# Patient Record
Sex: FEMALE | Race: WHITE | NOT HISPANIC OR LATINO | Employment: FULL TIME | URBAN - METROPOLITAN AREA
[De-identification: names, ages, dates, MRNs, and addresses within clinical notes are randomized per-mention and may not be internally consistent; named-entity substitution may affect disease eponyms.]

---

## 2017-01-17 ENCOUNTER — ALLSCRIPTS OFFICE VISIT (OUTPATIENT)
Dept: OTHER | Facility: OTHER | Age: 32
End: 2017-01-17

## 2017-01-17 ENCOUNTER — GENERIC CONVERSION - ENCOUNTER (OUTPATIENT)
Dept: OTHER | Facility: OTHER | Age: 32
End: 2017-01-17

## 2017-01-17 LAB — HCG, QUALITATIVE (HISTORICAL): NEGATIVE

## 2017-08-22 ENCOUNTER — ALLSCRIPTS OFFICE VISIT (OUTPATIENT)
Dept: OTHER | Facility: OTHER | Age: 32
End: 2017-08-22

## 2018-01-09 NOTE — PROCEDURES
Assessment    1  Encounter for supervision of other normal pregnancy in first trimester (V22 1) (Z34 81)    Plan  H/O polyhydramnios, History of vacuum extraction assisted delivery, Irritable bowel  syndrome, MVP (mitral valve prolapse), Pregnancy, obstetrical care    · *1 - [facility] Referral  Level II U/S @ 20 wks growth & anatomy  with consultation  Status: Active  Requested for: 65CTO6123   Ordered; For: H/O polyhydramnios, History of vacuum extraction assisted delivery, Irritable bowel syndrome, MVP (mitral valve prolapse), Pregnancy, obstetrical care; Ordered By: Mariela Sadler Performed:  Due: 77ZOI0688; Last Updated By: Mortimer Carolus; 3/11/2016 1:24:11 PM  Care Summary provided  : Yes  History of vacuum extraction assisted delivery, Irritable bowel syndrome, MVP (mitral  valve prolapse), Pregnancy, obstetrical care    · *1 - [facility] Physician Referral  Sequential risk assessment (1st/2nd)  trimester with consultation  Status: Active  Requested for: 18HVI3930   Ordered; For: History of vacuum extraction assisted delivery, Irritable bowel syndrome, MVP (mitral valve prolapse), Pregnancy, obstetrical care; Ordered By: Mariela Sadler Performed:  Due: 73AZA2893; Last Updated By: Mortimer Carolus; 3/11/2016 1:23:55 PM  Care Summary provided  : Yes  Pregnancy, obstetrical care    · (1) CHLAMYDIA/GC AMPLIFIED DNA, PCR; Source:Cervix; Status:Active - Retrospective  By Protocol Authorization; Requested for:[id number];    Perform:[facility]; Due:84Vpr4495; Last Updated By:Dion Chiang; 3/16/2016 11:13:01 AM;Ordered; For:Pregnancy, obstetrical care; Ordered By:Jim Mcneill; Active Problems    1  Encounter for supervision of other normal pregnancy in first trimester (V22 1) (Z34 81)   2  H/O polyhydramnios (V13 29) (Z87 59)   3  Has received influenza vaccination in current influenza season (V49 89) (Z78 9)   4   History of vacuum extraction assisted delivery (V45 89) (Z98 89)   5  Irritable bowel syndrome (564 1) (K58 9)   6  MVP (mitral valve prolapse) (424 0) (I34 1)   7  Need for prophylactic vaccination with combined diphtheria-tetanus-pertussis (DTaP)   vaccine (V06 1) (Z23)   8  Pregnancy, obstetrical care (V22 1) (Z34 90)    Current Meds    1  Prenatal One Daily 27-0 8 MG Oral Tablet; Therapy: 42SYQ7147 to Recorded    Allergies    1  No Known Drug Allergies    2  No Known Food Allergies   3  Seasonal    Results/Data  [address] Transvaginal Ultrasound OB [facility]:   Indication: EDC gestational age unknown weeks  Exam indication: LMP unknown  Findings:   Number of gestational sacs and fetuses: 1  Gestational sac/fetal measurements appropriate for gestation: CRL 2 81cm 9w4d 1015/16  Survey of visible fetal and placental anatomic structure   Qualitative assessment of amniotic fluid volume/gestational sac shape: nl appearing  Exam of maternal uterus and adnexa: nl appearing  Impression: viable IUP  consent determined by ultrasound  Future Appointments    Date/Time Provider Specialty Site   09/16/2016 01:20 PM WILLIE Myrick  Obstetrics/Gynecology [facility]   04/19/2016 01:00 PM WILLIE Ambriz  Obstetrics/Gynecology [facility]   08/02/2016 01:20 PM WILLIE Ambriz  Obstetrics/Gynecology [facility]   06/21/2016 01:00 PM Yang Barrios CNM Obstetrics/Gynecology [facility]   09/02/2016 01:00 PM Yang Barrios CNM Obstetrics/Gynecology [facility]   07/22/2016 01:20 PM Yanna Francisco DO Obstetrics/Gynecology [facility]   10/07/2016 01:00 PM Aga Elias [facility] Obstetrics/Gynecology [address]  [facility]   09/28/2016 01:45 PM Leo Stern MD Obstetrics/Gynecology [facility]   05/18/2016 01:40 PM WILLIE Steen   Obstetrics/Gynecology [facility]   08/17/2016 01:20 PM WILLIE Marques  Obstetrics/Gynecology [facility]   09/21/2016 01:20 PM WILLIE Marques   Obstetrics/Gynecology [facility]     Signatures   Electronically signed by : Marina Barone MD; Mar 16 2016 11:40AM EST                       (Author)

## 2018-01-12 VITALS
SYSTOLIC BLOOD PRESSURE: 118 MMHG | DIASTOLIC BLOOD PRESSURE: 74 MMHG | WEIGHT: 152 LBS | BODY MASS INDEX: 26.93 KG/M2 | HEIGHT: 63 IN

## 2018-01-13 NOTE — PROGRESS NOTES
Assessment    1  Encounter for gynecological examination without abnormal finding (V72 31) (Z01 419)    Plan  Contraceptive management    · Junel FE 1/20 1-20 MG-MCG Oral Tablet; talke 1 daily   Rx By: Zeinab Roach; Dispense: 84 Days ; #:84 Tablet; Refill: 3; For: Contraceptive management; CHAN = N; Verified Transmission to [facility]-/115; Last Updated By: System, Satellier; 8/22/2017 12:20:03 PM  Encounter for gynecological examination without abnormal finding    · Call [phone] if: You find a new or different kind of lump in your breast ;  Status:Complete;   Done: 86Uga7395 12:58PM   Ordered;  For:Encounter for gynecological examination without abnormal finding; Ordered By:Ruben Mcneill;   · Eat a normal well-balanced diet ; Status:Complete;   Done: 71QTT1177 12:58PM   Ordered;  For:Encounter for gynecological examination without abnormal finding; Ordered By:Ruben Mcneill;   · Vitamins can help you get daily requirements that your diet may not be giving you ;  Status:Complete;   Done: 83Bxp1827 12:58PM   Ordered;  For:Encounter for gynecological examination without abnormal finding; Ordered By:Ruben Mcneill;   · We encourage all of our patients to exercise regularly  30 minutes of exercise or physical  activity five or more days a week is recommended for children and adults ;  Status:Complete;   Done: 50Shk7311 12:58PM   Ordered;  For:Encounter for gynecological examination without abnormal finding; Ordered By:Ruben Mcneill;   · We recommend that you follow these steps to lower your risk of osteoporosis  ;  Status:Complete;   Done: 21DVQ6907 12:58PM   Ordered;  For:Encounter for gynecological examination without abnormal finding; Ordered By:Ruben Mcneill;    Discussion/Summary  healthy adult female next cervical cancer screening is due 2019 Patient discussion: discussed with the patient, 15 minute visit, greater than half of the time was spent on counseling  The patient has the current Goals: Healthy lifestyle  The patent has the current Barriers: None  Patient is able to Self-Care  PATIENT EDUCATION RECORD She was given the following educational materials:  Age appropriate care guide  Possible side effects of new medications were reviewed with the patient/guardian today  The treatment plan was reviewed with the patient/guardian  The patient/guardian understands and agrees with the treatment plan     Self Referrals: No      Chief Complaint  Patient here for yearly exam       History of Present Illness  GYN HM, Adult Female [facility]: The patient is being seen for a gynecology evaluation  The last health maintenance visit was 1 year(s) ago  Social History: Household members include spouse, 2 daughter(s) and 1and 3year old  She is   Work status: working full time  General Health: The patient's health since the last visit is described as good  Lifestyle:  She consumes a diverse and healthy diet  She does not have any weight concerns  She does not exercise regularly  She does not use tobacco  She consumes alcohol  She denies drug use  Reproductive health:  she reports no menstrual problems  Menstrual history: LMP: the last menstrual period was 8/1/17  she uses contraception  For contraception, she uses oral contraception pills  she is sexually active  pregnancy history: G 2P 2  Screening: Cervical cancer screening includes a pap smear performed 1/20/15, neg and human papilloma virus screening performed 1/20/15, neg  Review of Systems  no pelvic pain, no pelvic pressure, no vaginal pain, no vaginal discharge, no vaginal itching, no vaginal lump or mass, no vaginal odor, no nonmenstrual bleeding, periods are regular, no dysuria, no change in urinary frequency, no feelings of urinary urgency and no urinary loss of control  Constitutional: No fever, no chills, feels well, no tiredness, no recent weight gain or loss     ENT: no ear ache, no loss of hearing, no nosebleeds or nasal discharge, no sore throat or hoarseness  Cardiovascular: no complaints of slow or fast heart rate, no chest pain, no palpitations, no leg claudication or lower extremity edema  Respiratory: no complaints of shortness of breath, no wheezing, no dyspnea on exertion, no orthopnea or PND  Breasts: no complaints of breast pain, breast lump or nipple discharge  Gastrointestinal: no complaints of abdominal pain, no constipation, no nausea or diarrhea, no vomiting, no bloody stools  Genitourinary: no complaints of dysuria, no incontinence, no pelvic pain, no dysmenorrhea, no vaginal discharge or abnormal vaginal bleeding  Musculoskeletal: no complaints of arthralgia, no myalgia, no joint swelling or stiffness, no limb pain or swelling  Integumentary: no complaints of skin rash or lesion, no itching or dry skin, no skin wounds  Neurological: no complaints of headache, no confusion, no numbness or tingling, no dizziness or fainting  Active Problems    1  Contraceptive management (V25 9) (Z30 9)   2  Irritable bowel syndrome (564 1) (K58 9)   3   MVP (mitral valve prolapse) (424 0) (I34 1)    Past Medical History    · History of Encounter for routine gynecological examination (V72 31) (Z01 419)   · History of Encounter for routine postpartum follow-up (V24 2) (Z39 2)   · H/O polyhydramnios (V13 29) (Z87 59)   · History of Has received influenza vaccination in current influenza season (V49 89)  (Z78 9)   · History of Hereditary Disease Possibly Affecting Fetus, Affecting Care Of Mother -  Antepartum Condition Or Complication (009 43)   · History of abnormal menstrual cycle (V13 29) (Z87 42)   · History of herpes zoster (V12 09) (Z86 19)   · History of herpes zoster (V12 09) (Z86 19)   · History of mitral valve disorder (V12 59) (Z86 79)   · History of pregnancy (V13 29)   · History of vacuum extraction assisted delivery (V45 89) (I01 485)   · History of Irritable bowel syndrome (564 1) (K58 9)   · History of Pelvic floor dysfunction (618 83) (M62 89)   · Post-term Pregnancy - Antepartum Condition Or Prior Complicated Delivery (050 97)    Surgical History    · History of Appendectomy   · History of Oral Surgery Tooth Extraction    Family History  Mother    · Family history of Hypertension (V17 49)  Father    · Family history of Acute Myocardial Infarction (V17 3)   · Family history of Heart Disease (V17 49)  Family History    · Family history of Diabetes Mellitus (V18 0)   · Family history of Fibromyalgia   · Family history of Gastric Cancer (V16 0)   · Family history of Heart Disease (V17 49)   · Family history of Peptic Ulcer    Social History    · Always uses seat belt   · Being A Social Drinker   · Daily Tea Consumption (2  Cups/Day)   · Exercising Erratically   · Exercising Regularly   · Marital History - Currently    · Never A Smoker   · Stopped Drinking Alcohol    Current Meds   1  Junel FE 1/20 1-20 MG-MCG Oral Tablet; take 1 tablet by mouth once daily; Therapy: 12Iir8164 to (Evaluate:20Exu5924)  Requested for: 91Pms6789; Last   Rx:76Ssh3244 Ordered    Allergies    1  No Known Drug Allergies    2  No Known Food Allergies   3  Seasonal    Vitals   Recorded: 19UUL8908 63:01YZ   Systolic 509   Diastolic 62   Height 5 ft 3 in   Weight 151 lb    BMI Calculated 26 75   BSA Calculated 1 72   LMP 39Nxf4838     Physical Exam    Constitutional   General appearance: No acute distress, well appearing and well nourished  Genitourinary   External genitalia: Normal and no lesions appreciated  Vagina: Normal, no lesions or dryness appreciated  Urethra: Normal     Urethral meatus: Normal     Bladder: Normal, soft, non-tender and no prolapse or masses appreciated  Cervix: Normal, no palpable masses  Uterus: Normal, non-tender, not enlarged, and no palpable masses  Adnexa/parametria: Normal, non-tender and no fullness or masses appreciated      Chest Breasts: Normal and no dimpling or skin changes noted  Abdomen   Abdomen: Normal, non-tender, and no organomegaly noted         Future Appointments    Date/Time Provider Specialty Site   08/27/2018 11:00 AM Chetna Mckeon MD Obstetrics/Gynecology [facility]     Signatures   Electronically signed by : Ignacia Doyle MD; Aug 22 2017 12:59PM EST                       (Author)

## 2018-01-14 NOTE — PROGRESS NOTES
MAY 27 2016         RE: Alycia Hutton                                  To: [facility]  MR#: [id number]                                    [address]   : [address]   ENC: [id number]                             [place], [address]   (Exam #: [id number])                           Fax: [phone]      The LMP of this 33year old,  G2, P1-0-0-1 patient was unknown, her   working DASHA is OCT 13 2016 and the current gestational age is 24 weeks 6   days by 1st Trimester Sono  A sonographic examination was performed on MAY   27 2016 using real time equipment  The ultrasound examination was   performed using abdominal & vaginal techniques  The patient has a BMI of   27 3  Her blood pressure today was 110/55  Earliest ultrasound found in her record: 3/16/16 9w4d 10/15/16 DASHA      Thank you very much for referring this very nice patient for a    evaluation and fetal anatomic survey  This is the patient's second   pregnancy  She has a history of an uncomplicated vaginal delivery in     He states she has mitral valve prolapse but has not had any   significant issues with regard to this very common and generally benign   condition  She has a surgical history of an appendectomy  She denies   current use of tobacco, alcohol or drugs  Her medications include   vitamins and she has no significant drug allergies  Her family medical   history is noncontributory  A review of systems is otherwise negative  On   exam, the patient appears well, in no acute distress, and her abdomen is   nontender        Cardiac motion was observed at 154 bpm       INDICATIONS      fetal anatomical survey      Exam Types      LEVEL II   Transvaginal      RESULTS      Fetus # 1 of 1   Vertex presentation   Fetal growth appeared normal   Placenta Location = Anterior   No placenta previa   Placenta Grade = I      MEASUREMENTS (* Included In Average GA)      AC              14 6 cm        19 weeks 4 days* (46%)   BPD              4 7 cm        20 weeks 1 day * (56%)   HC              16 9 cm        19 weeks 4 days* (36%)   Femur            3 3 cm        20 weeks 4 days* (54%)      Nuchal Fold      4 5 mm      Humerus          3 2 cm        20 weeks 4 days  (65%)   Radius           2 5 cm        20 weeks 1 day   Ulna             2 7 cm        20 weeks 0 days   Tibia            2 8 cm        20 weeks 1 day   (53%)   Fibula           2 8 cm        19 weeks 3 days   Foot             3 4 cm        20 weeks 4 days      Cerebellum       2 0 cm        19 weeks 5 days   Biorbit          3 1 cm        20 weeks 1 day   CisternaMagna    4 3 mm      HC/AC           1 16   FL/AC           0 23   FL/BPD          0 71   EFW (Ac/Fl/Hc)   327 grams - 0 lbs 12 oz      THE AVERAGE GESTATIONAL AGE is 20 weeks 0 days +/- 10 days  AMNIOTIC FLUID         Largest Vertical Pocket = 5 0 cm   Amniotic Fluid: Normal      UTERINE ARTERIES                                  S/D   PI    RI    NOTCH       Left Uterine Artery        2 38  0 99  0 58       Right Uterine Artery       2 31  0 93  0 57      CERVICAL EVALUATION      The cervix appeared normal (Ultrasound Examination)  SUPINE      Cervical Length: 3 13 cm      OTHER TEST RESULTS           Funneling?: No             Dynamic Changes?: No        Resp  To TFP?: No      ANATOMY DETAILS      Visualized Appearing Sonographically Normal:   HEAD: (Calvarium, BPD Level, Lateral Ventricles, Choroid Plexus,   Cerebellum, Cisterna Magna);    FACE/NECK: (Neck, Nuchal Fold, Profile,   Orbits, Nose/Lips, Palate, Face);    TH  CAV : (Diaphragm); HEART: (3   Vessel Trachea, Four Chamber View, Proximal Left Outflow, Proximal Right   Outflow, Aortic Arch, Ductal Arch, Short Axis of Greater Vessels, Cardiac   Axis, Interventricular Septum, Interatrial Septum, Cardiac Position);      ABD  CAV , STOMACH, RIGHT KIDNEY, LEFT KIDNEY, BLADDER, ABD  WALL, SPINE:   (Cervical Spine, Thoracic Spine, Lumbar Spine, Sacrum);    EXTREMS: (Lt   Humerus, Rt Humerus, Lt Forearm, Rt Forearm, Lt Hand, Rt Hand, Lt Femur,   Rt Femur, Lt Low Leg, Rt Low Leg, Lt Foot, Rt Foot);    GENITALIA,   PLACENTA, UMBL  CORD, UTERUS, PCI      ADNEXA      The left ovary appeared normal and measured 3 3 x 2 2 x 1 8 cm with a   volume of 6 8 cc  The right ovary appeared normal and measured 3 1 x 2 1 x   2 0 cm with a volume of 6 8 cc  IMPRESSION      White IUP   20 weeks and 0 days by this ultrasound  (DASHA=OCT 14 2016)   19 weeks and 6 days by 1st Tri Sono  (DASHA=OCT 15 2016)   Vertex presentation   Fetal growth appeared normal   Normal anatomy survey   Regular fetal heart rate of 154 bpm   Anterior placenta   No placenta previa      GENERAL COMMENT      The patient has declined genetic screening, and we discussed that a normal   ultrasound does not exclude all congenital birth defects or karyotypic   abnormalities  The fetal anatomic survey is complete  There is no sonographic evidence   of fetal abnormalities at this time  Good fetal movement and tone are   seen  The amniotic fluid volume appears normal   The placenta is anterior   and it appears sonographically normal  No notching of either uterine   artery waveform is appreciated and resistive indices are normal   A   transvaginal ultrasound was performed to assess the cervix, which was not   seen well transabdominally  The cervical length was 3 13 centimeters,   which is normal for the current gestational age  There was no significant   funneling or dynamic changes appreciated  The patient was informed of   today's findings and all of her questions were answered  The limitations   of ultrasound were reviewed with the patient, which she appears to   understand  The patient had questions regarding Zika virus  I informed her about and   we discussed about current CDC guidelines regarding Zika virus      Thankfully, [place] virus is not currently in the area but it appears that it   may be later on this summer  We discussed travel precautions and   especially precautions against mosquito bites by utizlizing mosquito   repellent containing DEET, which is safe during pregnancy and is known to   be effective in reducing mosquito bites  We discussed safe sex practices   if a partner may have been exposed to [place] virus or traveled to an endemic   area with [place]  Please note, in addition to the time spent discussing the results of the   ultrasound, I spent approximately 15 minutes of face-to-face time with the   patient, greater than 50% of which was spent in counseling and the   coordination of care for this patient  Thank you very much for allowing us to participate in the care of this   very nice patient  Should you have any questions, please do not hesitate   to contact our office  ROSELINE Higgins M D     Electronically signed 05/27/16 10:28

## 2018-01-14 NOTE — PROGRESS NOTES
Assessment    1  Encounter for routine gynecological examination (V72 31) (Z01 419)    Plan  Encounter for routine gynecological examination    · Call [phone] if: You find a new or different kind of lump in your breast ;  Status:Complete;   Done: [id number] 01:38PM   Ordered;  For:Encounter for routine gynecological examination; Ordered By:Luz Mcneill;   · Eat a normal well-balanced diet ; Status:Complete;   Done: 54FUF3311 01:38PM   Ordered;  For:Encounter for routine gynecological examination; Ordered By:Luz Mcneill;   · Vitamins can help you get daily requirements that your diet may not be giving you ;  Status:Complete;   Done: 80TRS4558 01:38PM   Ordered;  For:Encounter for routine gynecological examination; Ordered By:Luz Mcneill;   · We encourage all of our patients to exercise regularly  30 minutes of exercise or physical  activity five or more days a week is recommended for children and adults ;  Status:Complete;   Done: [id number] 01:38PM   Ordered;  For:Encounter for routine gynecological examination; Ordered By:Luz Mcneill;   · We recommend that you follow these steps to lower your risk of osteoporosis  ;  Status:Complete;   Done: 42KXG2328 01:38PM   Ordered;  For:Encounter for routine gynecological examination; Ordered By:Luz Mcneill;    Discussion/Summary  healthy adult female next cervical cancer screening is due 2019 Patient discussion: discussed with the patient, 15 minute visit, greater than half of the time was spent on counseling  If trying for over an year and would like to have a fertility evalation Mandy Andres should call  declines clomid at this time  Chief Complaint  Pt is here for yearly exam       History of Present Illness  GYN HM, Adult Female [facility]: The patient is being seen for a gynecology evaluation  The last health maintenance visit was 1 year(s) ago     Social History: Household members include spouse, 1 daughter(s) and 18 months  She is   General Health: The patient's health since the last visit is described as good  Lifestyle:  She consumes a diverse and healthy diet  She does not exercise regularly  She does not use tobacco  She consumes alcohol  She denies drug use  Reproductive health:  she reports menstrual problems  Menstrual history: LMP: the last menstrual period was 1/4/2016  she uses no contraception  she is sexually active  pregnancy history: G 1P 1  Screening: Cervical cancer screening includes a pap smear performed 1/20/2015, negative and human papilloma virus screening performed 1/20/2015, negative  Review of Systems  periods are irregular, but no pelvic pain, no pelvic pressure, no vaginal pain, no vaginal discharge, no vaginal itching, no vaginal lump or mass, no vaginal odor, no nonmenstrual bleeding, no dysuria, no change in urinary frequency, no feelings of urinary urgency and no urinary loss of control  Additional findings include cycles are sometimes 40-58 days apart  using ovulatio kits to determine ovulation  Constitutional: No fever, no chills, feels well, no tiredness, no recent weight gain or loss  ENT: no ear ache, no loss of hearing, no nosebleeds or nasal discharge, no sore throat or hoarseness  Cardiovascular: no complaints of slow or fast heart rate, no chest pain, no palpitations, no leg claudication or lower extremity edema  Respiratory: no complaints of shortness of breath, no wheezing, no dyspnea on exertion, no orthopnea or PND  Breasts: no complaints of breast pain, breast lump or nipple discharge  Gastrointestinal: no complaints of abdominal pain, no constipation, no nausea or diarrhea, no vomiting, no bloody stools  Genitourinary: no complaints of dysuria, no incontinence, no pelvic pain, no dysmenorrhea, no vaginal discharge or abnormal vaginal bleeding     Musculoskeletal: no complaints of arthralgia, no myalgia, no joint swelling or stiffness, no limb pain or swelling  Integumentary: no complaints of skin rash or lesion, no itching or dry skin, no skin wounds  Neurological: no complaints of headache, no confusion, no numbness or tingling, no dizziness or fainting  Active Problems    1  Abnormal menses (626 9) (N92 6)   2  Encounter for routine gynecological examination (V72 31) (Z01 419)   3  Irritable bowel syndrome (564 1) (K58 9)   4  Pelvic floor dysfunction (618 83) (N81 84)    Past Medical History    · History of Encounter for routine postpartum follow-up (V24 2) (Z39 2)   · History of Hereditary Disease Possibly Affecting Fetus, Affecting Care Of Mother -  Antepartum Condition Or Complication (714 78)   · History of herpes zoster (V12 09) (Z86 19)   · History of herpes zoster (V12 09) (Z86 19)   · History of mitral valve disorder (V12 59) (Z86 79)   · History of pregnancy (V13 29)   · History of Irritable bowel syndrome (564 1) (K58 9)   · Post-term Pregnancy - Antepartum Condition Or Prior Complicated Delivery (644 21)    Surgical History    · History of Appendectomy   · History of Oral Surgery Tooth Extraction    Family History    · Family history of Hypertension (V17 49)    · Family history of Acute Myocardial Infarction (V17 3)   · Family history of Heart Disease (V17 49)    · Family history of Diabetes Mellitus (V18 0)   · Family history of Fibromyalgia   · Family history of Gastric Cancer (V16 0)   · Family history of Heart Disease (V17 49)   · Family history of Peptic Ulcer    Social History    · Being A Social Drinker   · Daily Tea Consumption (2  Cups/Day)   · Exercising Erratically   · Exercising Regularly   · Marital History - Currently    · Never A Smoker   · Stopped Drinking Alcohol    Current Meds   1  Prenatal One Daily 27-0 8 MG Oral Tablet; Therapy: [id number] to Recorded    Allergies    1  No Known Drug Allergies    2  No Known Food Allergies   3   Seasonal    Vitals   Recorded: 98VWR5008 33:27VR   Systolic 894   Diastolic 70   Height 5 ft 3 in   Weight 147 lb    BMI Calculated 26 04   BSA Calculated 1 7   LMP [id number]     Physical Exam    Constitutional   General appearance: No acute distress, well appearing and well nourished  Genitourinary   External genitalia: Normal and no lesions appreciated  Vagina: Normal, no lesions or dryness appreciated  Urethra: Normal     Urethral meatus: Normal     Bladder: Normal, soft, non-tender and no prolapse or masses appreciated  Cervix: Normal, no palpable masses  Uterus: Normal, non-tender, not enlarged, and no palpable masses  Adnexa/parametria: Normal, non-tender and no fullness or masses appreciated  Chest   Breasts: Normal and no dimpling or skin changes noted  Abdomen   Abdomen: Normal, non-tender, and no organomegaly noted         Signatures   Electronically signed by : Swapnil Aldana MD; Jan 20 2016  1:40PM EST                       (Author)

## 2018-01-17 NOTE — RESULT NOTES
Verified Results  Urine HCG- POC [id number] 04:20PM Portillo Osborn     Test Name Result Flag Reference   Urine HCG Negative

## 2018-01-22 VITALS
BODY MASS INDEX: 26.75 KG/M2 | DIASTOLIC BLOOD PRESSURE: 62 MMHG | WEIGHT: 151 LBS | HEIGHT: 63 IN | SYSTOLIC BLOOD PRESSURE: 104 MMHG

## 2018-07-16 DIAGNOSIS — Z30.09 CONTRACEPTIVE USE EDUCATION: Primary | ICD-10-CM

## 2018-07-16 RX ORDER — NORETHINDRONE ACETATE AND ETHINYL ESTRADIOL AND FERROUS FUMARATE 1MG-20(21)
KIT ORAL
Qty: 84 TABLET | Refills: 3 | Status: SHIPPED | OUTPATIENT
Start: 2018-07-16 | End: 2019-06-29 | Stop reason: SDUPTHER

## 2019-01-04 ENCOUNTER — OFFICE VISIT (OUTPATIENT)
Dept: FAMILY MEDICINE CLINIC | Facility: CLINIC | Age: 34
End: 2019-01-04
Payer: COMMERCIAL

## 2019-01-04 VITALS
TEMPERATURE: 99.2 F | BODY MASS INDEX: 25.44 KG/M2 | SYSTOLIC BLOOD PRESSURE: 106 MMHG | HEART RATE: 68 BPM | DIASTOLIC BLOOD PRESSURE: 64 MMHG | HEIGHT: 64 IN | OXYGEN SATURATION: 98 % | RESPIRATION RATE: 18 BRPM | WEIGHT: 149 LBS

## 2019-01-04 DIAGNOSIS — Z13.220 SCREENING CHOLESTEROL LEVEL: ICD-10-CM

## 2019-01-04 DIAGNOSIS — Z00.00 ANNUAL PHYSICAL EXAM: Primary | ICD-10-CM

## 2019-01-04 DIAGNOSIS — R59.9 SWOLLEN LYMPH NODES: ICD-10-CM

## 2019-01-04 DIAGNOSIS — G44.209 TENSION HEADACHE: ICD-10-CM

## 2019-01-04 DIAGNOSIS — M54.2 NECK PAIN, MUSCULOSKELETAL: ICD-10-CM

## 2019-01-04 PROCEDURE — 99203 OFFICE O/P NEW LOW 30 MIN: CPT | Performed by: NURSE PRACTITIONER

## 2019-01-04 PROCEDURE — 3008F BODY MASS INDEX DOCD: CPT | Performed by: NURSE PRACTITIONER

## 2019-01-04 PROCEDURE — 99385 PREV VISIT NEW AGE 18-39: CPT | Performed by: NURSE PRACTITIONER

## 2019-01-04 RX ORDER — PREDNISONE 20 MG/1
TABLET ORAL
Qty: 15 TABLET | Refills: 0 | Status: SHIPPED | OUTPATIENT
Start: 2019-01-04 | End: 2019-08-09

## 2019-01-04 RX ORDER — CYCLOBENZAPRINE HCL 5 MG
10 TABLET ORAL 3 TIMES DAILY PRN
Qty: 30 TABLET | Refills: 0 | Status: SHIPPED | OUTPATIENT
Start: 2019-01-04 | End: 2019-08-09

## 2019-01-04 RX ORDER — NAPROXEN 500 MG/1
500 TABLET ORAL 2 TIMES DAILY WITH MEALS
Qty: 60 TABLET | Refills: 0 | Status: SHIPPED | OUTPATIENT
Start: 2019-01-04 | End: 2019-08-09

## 2019-01-04 NOTE — PATIENT INSTRUCTIONS
Prednisone, Naprosyn, and Flexeril as directed  Consult ENT if no improvement with medications  Labs as ordered  Will do CT neck if no improvement or worsening symptoms

## 2019-01-04 NOTE — PROGRESS NOTES
[facility]        NAME: Marge Woods is a 26 y o  female  : 1985    MRN: [id number]  DATE: 2019  TIME: 2:59 PM    Assessment and Plan   Swollen lymph nodes [R59 9]  1  Swollen lymph nodes  Lyme Antibody Profile with reflex to WB    EBV acute panel    TSH, 3rd generation with Free T4 reflex    Ambulatory Referral to Otolaryngology    Comprehensive metabolic panel    CBC and differential    Sedimentation rate, automated   2  Screening cholesterol level  Lipid panel   3  Annual physical exam     4  Tension headache  Lyme Antibody Profile with reflex to WB    TSH, 3rd generation with Free T4 reflex   5  Neck pain, musculoskeletal  Lyme Antibody Profile with reflex to WB    TSH, 3rd generation with Free T4 reflex    Sedimentation rate, automated    cyclobenzaprine (FLEXERIL) 5 mg tablet    predniSONE 20 mg tablet    naproxen (NAPROSYN) 500 mg tablet         Patient Instructions     Patient Instructions   Prednisone, Naprosyn, and Flexeril as directed  Consult ENT if no improvement with medications  Labs as ordered  Will do CT neck if no improvement or worsening symptoms          Chief Complaint     Chief Complaint   Patient presents with    swollen lymph nodes bilateral neck     symptoms on and off X 2 weeks         History of Present Illness       Patient here with c/o swollen lymph nodes b/l neck x 1 year on and off, more on the right,not painful  Swells for about 4-5 days then resolves  Recently lymph nodes have been painful  No association to illness  Reports seasonal allergies  + right shoulder pain  Not worse with activity  + headaches daily for the last month  No history of migraines  Lyme titer about 1 year ago and other blood work  No history of mono  Denies fatigue  Review of Systems   Review of Systems   Constitutional: Negative for activity change, appetite change, chills, fatigue and fever  HENT: Negative  Respiratory: Negative  Cardiovascular: Negative  Gastrointestinal: Negative  Musculoskeletal: Positive for neck pain  Negative for joint swelling and myalgias         + Right shoulder pain   Skin: Negative for rash  Allergic/Immunologic: Positive for environmental allergies  Neurological: Positive for headaches  Negative for dizziness and light-headedness  Hematological: Positive for adenopathy  Current Medications       Current Outpatient Prescriptions:     JUNEL FE 1/20 1-20 MG-MCG per tablet, take 1 tablet by mouth once daily, Disp: 84 tablet, Rfl: 3    cyclobenzaprine (FLEXERIL) 5 mg tablet, Take 2 tablets (10 mg total) by mouth 3 (three) times a day as needed for muscle spasms, Disp: 30 tablet, Rfl: 0    naproxen (NAPROSYN) 500 mg tablet, Take 1 tablet (500 mg total) by mouth 2 (two) times a day with meals, Disp: 60 tablet, Rfl: 0    predniSONE 20 mg tablet, 20mg PO BID x 5 days then 20mg PO DAILY x 5 days, Disp: 15 tablet, Rfl: 0    Current Allergies     Allergies as of 01/04/2019 - Reviewed 01/04/2019   Allergen Reaction Noted    Other  09/16/2013    Pollen extract  10/19/2016            The following portions of the patient's history were reviewed and updated as appropriate: allergies, current medications, past family history, past medical history, past social history, past surgical history and problem list      Past Medical History:   Diagnosis Date    Anemia     pt reports after first delivery hemoglobin dropped, pt did not receive any blood transfusion    History of IBS     Multiple thyroid nodules     MVP (mitral valve prolapse)     Oligomenorrhea     Seasonal allergies     Shingles     Varicella     had as child, shingles        Past Surgical History:   Procedure Laterality Date    APPENDECTOMY      WISDOM TOOTH EXTRACTION         Family History   Problem Relation Age of Onset    Heart disease Father          Medications have been verified          Objective   /64   Pulse 68 Temp 99 2 °F (37 3 °C) (Tympanic)   Resp 18   Ht 5' 4" (1 626 m)   Wt 67 6 kg (149 lb)   SpO2 98%   BMI 25 58 kg/m²        Physical Exam     Physical Exam   Constitutional: She is oriented to person, place, and time  She appears well-developed and well-nourished  No distress  HENT:   Head: Normocephalic and atraumatic  Right Ear: Tympanic membrane, external ear and ear canal normal    Left Ear: Tympanic membrane, external ear and ear canal normal    Nose: Nose normal  Right sinus exhibits no maxillary sinus tenderness and no frontal sinus tenderness  Left sinus exhibits no maxillary sinus tenderness and no frontal sinus tenderness  Mouth/Throat: Uvula is midline, oropharynx is clear and moist and mucous membranes are normal  No oropharyngeal exudate  Eyes: Pupils are equal, round, and reactive to light  Conjunctivae and EOM are normal  Right eye exhibits no discharge  Left eye exhibits no discharge  Neck: Normal range of motion  Neck supple  No tracheal deviation present  No thyromegaly present  Cardiovascular: Normal rate, regular rhythm and normal heart sounds  Exam reveals no gallop and no friction rub  No murmur heard  Pulmonary/Chest: Effort normal and breath sounds normal  No respiratory distress  She has no wheezes  She has no rales  Musculoskeletal: Normal range of motion  She exhibits no edema, tenderness or deformity  Lymphadenopathy:     She has no cervical adenopathy  Neurological: She is alert and oriented to person, place, and time  No cranial nerve deficit  Coordination normal    Skin: Skin is warm, dry and intact  No rash noted  She is not diaphoretic  No erythema  Psychiatric: She has a normal mood and affect  Her speech is normal and behavior is normal  Judgment and thought content normal  Cognition and memory are normal    Nursing note and vitals reviewed

## 2019-01-04 NOTE — PROGRESS NOTES
[facility]        NAME: Seb Schmid is a 26 y o  female  : 1985    MRN: [id number]  DATE: 2019  TIME: 3:02 PM    Assessment and Plan   Annual physical exam [A27 81]  4  Annual physical exam     2  Swollen lymph nodes  Lyme Antibody Profile with reflex to WB    EBV acute panel    TSH, 3rd generation with Free T4 reflex    Ambulatory Referral to Otolaryngology    Comprehensive metabolic panel    CBC and differential    Sedimentation rate, automated   3  Screening cholesterol level  Lipid panel   4  Tension headache  Lyme Antibody Profile with reflex to WB    TSH, 3rd generation with Free T4 reflex   5  Neck pain, musculoskeletal  Lyme Antibody Profile with reflex to WB    TSH, 3rd generation with Free T4 reflex    Sedimentation rate, automated    cyclobenzaprine (FLEXERIL) 5 mg tablet    predniSONE 20 mg tablet    naproxen (NAPROSYN) 500 mg tablet         Patient Instructions     Patient Instructions   Prednisone, Naprosyn, and Flexeril as directed  Consult ENT if no improvement with medications  Labs as ordered  Will do CT neck if no improvement or worsening symptoms          Chief Complaint     Chief Complaint   Patient presents with    swollen lymph nodes bilateral neck     symptoms on and off X 2 weeks         History of Present Illness       Annual physical- wants labs ordered,  See acute note for swollen lymph nodes        Review of Systems   Review of Systems   Constitutional: Negative for activity change, diaphoresis, fatigue and fever  HENT: Negative for congestion, facial swelling, hearing loss, rhinorrhea, sinus pain, sinus pressure, sneezing, sore throat and voice change  Eyes: Negative for discharge and visual disturbance  Respiratory: Negative for cough, choking, chest tightness, shortness of breath, wheezing and stridor  Cardiovascular: Negative for chest pain, palpitations and leg swelling     Gastrointestinal: Negative for abdominal distention, abdominal pain, constipation, diarrhea, nausea and vomiting  Endocrine: Negative for polydipsia, polyphagia and polyuria  Genitourinary: Negative for difficulty urinating, dysuria, frequency and urgency  Musculoskeletal: Positive for neck pain  Negative for arthralgias, back pain, gait problem, joint swelling, myalgias and neck stiffness  Skin: Negative for color change, rash and wound  Neurological: Positive for headaches  Negative for dizziness, syncope, speech difficulty, weakness and light-headedness  Hematological: Positive for adenopathy  Does not bruise/bleed easily  Psychiatric/Behavioral: Negative for agitation, behavioral problems, confusion, hallucinations, sleep disturbance and suicidal ideas  The patient is not nervous/anxious            Current Medications       Current Outpatient Prescriptions:     JUNEL FE 1/20 1-20 MG-MCG per tablet, take 1 tablet by mouth once daily, Disp: 84 tablet, Rfl: 3    cyclobenzaprine (FLEXERIL) 5 mg tablet, Take 2 tablets (10 mg total) by mouth 3 (three) times a day as needed for muscle spasms, Disp: 30 tablet, Rfl: 0    naproxen (NAPROSYN) 500 mg tablet, Take 1 tablet (500 mg total) by mouth 2 (two) times a day with meals, Disp: 60 tablet, Rfl: 0    predniSONE 20 mg tablet, 20mg PO BID x 5 days then 20mg PO DAILY x 5 days, Disp: 15 tablet, Rfl: 0    Current Allergies     Allergies as of 01/04/2019 - Reviewed 01/04/2019   Allergen Reaction Noted    Other  09/16/2013    Pollen extract  10/19/2016            The following portions of the patient's history were reviewed and updated as appropriate: allergies, current medications, past family history, past medical history, past social history, past surgical history and problem list      Past Medical History:   Diagnosis Date    Anemia     pt reports after first delivery hemoglobin dropped, pt did not receive any blood transfusion    History of IBS     Multiple thyroid nodules     MVP (mitral valve prolapse)     Oligomenorrhea     Seasonal allergies     Shingles     Varicella     had as child, shingles        Past Surgical History:   Procedure Laterality Date    APPENDECTOMY      WISDOM TOOTH EXTRACTION         Family History   Problem Relation Age of Onset    Heart disease Father          Medications have been verified  Objective   /64   Pulse 68   Temp 99 2 °F (37 3 °C) (Tympanic)   Resp 18   Ht 5' 4" (1 626 m)   Wt 67 6 kg (149 lb)   SpO2 98%   BMI 25 58 kg/m²        Physical Exam     Physical Exam   Constitutional: She is oriented to person, place, and time  She appears well-developed and well-nourished  No distress  HENT:   Head: Normocephalic and atraumatic  Right Ear: Tympanic membrane, external ear and ear canal normal    Left Ear: Tympanic membrane, external ear and ear canal normal    Nose: Nose normal    Mouth/Throat: Uvula is midline, oropharynx is clear and moist and mucous membranes are normal    Eyes: Pupils are equal, round, and reactive to light  Conjunctivae and EOM are normal  Right eye exhibits no discharge  Left eye exhibits no discharge  Neck: Normal range of motion  Neck supple  No thyromegaly present  Cardiovascular: Normal rate, regular rhythm and normal heart sounds  Exam reveals no gallop and no friction rub  No murmur heard  Pulmonary/Chest: Effort normal and breath sounds normal  No respiratory distress  She has no wheezes  She has no rales  Musculoskeletal: Normal range of motion  She exhibits no edema, tenderness or deformity  Lymphadenopathy:     She has no cervical adenopathy  Neurological: She is alert and oriented to person, place, and time  No cranial nerve deficit  Coordination normal    Skin: Skin is warm and dry  No rash noted  She is not diaphoretic  No erythema  Psychiatric: She has a normal mood and affect  Her behavior is normal  Judgment and thought content normal    Nursing note and vitals reviewed

## 2019-06-29 DIAGNOSIS — Z30.09 CONTRACEPTIVE USE EDUCATION: ICD-10-CM

## 2019-06-29 RX ORDER — NORETHINDRONE ACETATE AND ETHINYL ESTRADIOL AND FERROUS FUMARATE 1MG-20(21)
KIT ORAL
Qty: 84 TABLET | Refills: 3 | Status: SHIPPED | OUTPATIENT
Start: 2019-06-29 | End: 2020-05-01

## 2019-08-09 ENCOUNTER — OFFICE VISIT (OUTPATIENT)
Dept: FAMILY MEDICINE CLINIC | Facility: CLINIC | Age: 34
End: 2019-08-09
Payer: COMMERCIAL

## 2019-08-09 VITALS
HEIGHT: 64 IN | BODY MASS INDEX: 25.44 KG/M2 | DIASTOLIC BLOOD PRESSURE: 68 MMHG | HEART RATE: 54 BPM | WEIGHT: 149 LBS | RESPIRATION RATE: 18 BRPM | SYSTOLIC BLOOD PRESSURE: 104 MMHG | OXYGEN SATURATION: 98 % | TEMPERATURE: 98 F

## 2019-08-09 DIAGNOSIS — Z01.419 ENCOUNTER FOR GYNECOLOGICAL EXAMINATION (GENERAL) (ROUTINE) WITHOUT ABNORMAL FINDINGS: Primary | ICD-10-CM

## 2019-08-09 DIAGNOSIS — E66.3 OVERWEIGHT (BMI 25.0-29.9): ICD-10-CM

## 2019-08-09 DIAGNOSIS — Z12.4 CERVICAL CANCER SCREENING: ICD-10-CM

## 2019-08-09 DIAGNOSIS — Z01.419 PAP SMEAR, AS PART OF ROUTINE GYNECOLOGICAL EXAMINATION: ICD-10-CM

## 2019-08-09 PROCEDURE — G0143 SCR C/V CYTO,THINLAYER,RESCR: HCPCS | Performed by: NURSE PRACTITIONER

## 2019-08-09 PROCEDURE — S0612 ANNUAL GYNECOLOGICAL EXAMINA: HCPCS | Performed by: NURSE PRACTITIONER

## 2019-08-09 PROCEDURE — 87624 HPV HI-RISK TYP POOLED RSLT: CPT | Performed by: NURSE PRACTITIONER

## 2019-08-09 PROCEDURE — 3008F BODY MASS INDEX DOCD: CPT | Performed by: NURSE PRACTITIONER

## 2019-08-09 PROCEDURE — 1036F TOBACCO NON-USER: CPT | Performed by: NURSE PRACTITIONER

## 2019-08-09 NOTE — PROGRESS NOTES
HPI:  Carmita Rosario is a 37 y o  female here for her yearly health maintenance exam    Patient Active Problem List   Diagnosis    40 weeks gestation of pregnancy    Polyhydramnios     (spontaneous vaginal delivery)     Past Medical History:   Diagnosis Date    Anemia     pt reports after first delivery hemoglobin dropped, pt did not receive any blood transfusion    History of IBS     Multiple thyroid nodules     MVP (mitral valve prolapse)     Oligomenorrhea     Seasonal allergies     Shingles     Varicella     had as child, shingles        1  Advanced Directive: n/a     2  Durable Power of  for Healthcare: n/a     3  Social History:               Marital History: - Rajatalycia Waldropsonia              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              Drug and alcohol History: none              Alcohol Use: weekends- 2 glasses of wine daily     4  General Health: excellent health              Regular Dental Visits:yes              Vision problems:none              Hearing Loss:none              Immunizations up to date: UTD                 Lifestyle:                           Healthy Diet:portion control                          Tobacco Jamari Ban                          Regular exercise:4x/week                          Weight concerns:none                          Drug abuse: none     5  Reproductive Health (females only)                             Menstrual Problems: none              Contraceptions:BCP              Sexually Active:yes, no new partners in the last 6 months              Pregnancy History:    LMP: 17  6     PHQ-9 Depression Screening    PHQ-9:    Frequency of the following problems over the past two weeks:       Little interest or pleasure in doing things:  0 - not at all  Feeling down, depressed, or hopeless:  0 - not at all  PHQ-2 Score:  0           Current Outpatient Medications   Medication Sig Dispense Refill    JUNEL FE 1/20 1-20 MG-MCG per tablet take 1 tablet by mouth once daily 84 tablet 3     No current facility-administered medications for this visit  Allergies   Allergen Reactions    Other     Pollen Extract      Immunization History   Administered Date(s) Administered     Influenza (IM) Preservative Free 10/12/2015    INFLUENZA 11/01/2013, 10/12/2015    Tdap 04/07/2014, 08/26/2016       Patient Care Team:  Robert Bartlett as PCP - General (Family Medicine)  Angela Morin, Gena Barnes, MD Sagrario Mooney, Shaila Arrieta, MD Gerard Klein, MD Pretty Aguiar, MD Gita Fresh, MD Nestora Barthel, MD Adelaida Gamez, MD Brigida Tidwell MD    Review of Systems   Constitutional: Negative for activity change, appetite change, fatigue and fever  HENT: Negative for congestion, sinus pressure and sore throat  Eyes: Negative for pain, discharge and visual disturbance  Respiratory: Negative for cough, chest tightness, shortness of breath and wheezing  Cardiovascular: Negative for chest pain, palpitations and leg swelling  Gastrointestinal: Negative for abdominal distention, abdominal pain, constipation, diarrhea and nausea  Endocrine: Negative for polydipsia, polyphagia and polyuria  Genitourinary: Negative for difficulty urinating, dysuria, frequency, urgency, vaginal bleeding and vaginal discharge  Musculoskeletal: Negative for back pain, gait problem, joint swelling, myalgias and neck stiffness  Skin: Negative for color change  Neurological: Negative for dizziness, syncope, speech difficulty, weakness and headaches  Hematological: Negative for adenopathy  Does not bruise/bleed easily  Psychiatric/Behavioral: Negative for agitation, behavioral problems, confusion and hallucinations  The patient is not nervous/anxious  Physical Exam :  Physical Exam   Constitutional: She is oriented to person, place, and time  She appears well-developed and well-nourished  No distress     HENT:   Head: Normocephalic and atraumatic  Right Ear: External ear normal    Left Ear: External ear normal    Nose: Nose normal    Mouth/Throat: Oropharynx is clear and moist    Eyes: Pupils are equal, round, and reactive to light  Conjunctivae and EOM are normal  Right eye exhibits no discharge  Neck: Normal range of motion  Neck supple  No tracheal deviation present  No thyromegaly present  Cardiovascular: Normal rate, regular rhythm and normal heart sounds  Exam reveals no gallop and no friction rub  No murmur heard  Pulmonary/Chest: Effort normal and breath sounds normal  No respiratory distress  She has no wheezes  She has no rales  No breast tenderness, discharge or bleeding  Abdominal: Soft  Bowel sounds are normal  She exhibits no distension and no mass  There is no tenderness  There is no rebound and no guarding  Genitourinary: No breast tenderness, discharge or bleeding  There is no rash, tenderness, lesion or injury on the right labia  There is no rash, tenderness, lesion or injury on the left labia  Uterus is not deviated, not enlarged and not tender  Cervix exhibits no motion tenderness, no discharge and no friability  Right adnexum displays no mass, no tenderness and no fullness  Left adnexum displays no mass, no tenderness and no fullness  No erythema, tenderness or bleeding in the vagina  No foreign body in the vagina  No signs of injury around the vagina  No vaginal discharge found  Musculoskeletal: Normal range of motion  She exhibits no edema, tenderness or deformity  Neurological: She is alert and oriented to person, place, and time  No cranial nerve deficit  Coordination normal    Skin: Skin is warm and dry  No rash noted  She is not diaphoretic  No erythema  Psychiatric: She has a normal mood and affect  Her behavior is normal  Judgment and thought content normal    Nursing note and vitals reviewed          Reviewed Updated [facility] Prior Wellness Visits:   Last Health Maintenance visit information was reviewed, patient interviewed , no change since last HM visit no  Last HM visit information was reviewed, patient interviewed and updates made to the record today no    Assessment and Plan:  1  Encounter for gynecological examination (general) (routine) without abnormal findings     2  Pap smear, as part of routine gynecological examination  Liquid-based pap, screening   3  Cervical cancer screening  Liquid-based pap, screening   4  Overweight (BMI 25 0-29  9)         Health Maintenance Due   Topic Date Due    BMI: Followup Plan  03/16/2001    PAP SMEAR  03/16/2004    INFLUENZA VACCINE  07/01/2019        BMI Counseling: Body mass index is 25 58 kg/m²  Discussed the patient's BMI with her  The BMI is above average  BMI counseling and education was provided to the patient  Exercise recommendations include exercising 3-5 times per week

## 2019-08-13 LAB
HPV HR 12 DNA CVX QL NAA+PROBE: NEGATIVE
HPV16 DNA CVX QL NAA+PROBE: NEGATIVE
HPV18 DNA CVX QL NAA+PROBE: NEGATIVE
LAB AP GYN PRIMARY INTERPRETATION: NORMAL
Lab: NORMAL

## 2020-05-01 DIAGNOSIS — Z30.09 CONTRACEPTIVE USE EDUCATION: ICD-10-CM

## 2020-05-01 RX ORDER — NORETHINDRONE ACETATE/ETHINYL ESTRADIOL AND FERROUS FUMARATE 1MG-20(21)
KIT ORAL
Qty: 84 TABLET | Refills: 3 | Status: SHIPPED | OUTPATIENT
Start: 2020-05-01 | End: 2020-05-22

## 2020-05-22 DIAGNOSIS — Z30.09 CONTRACEPTIVE USE EDUCATION: ICD-10-CM

## 2020-05-22 RX ORDER — NORETHINDRONE ACETATE/ETHINYL ESTRADIOL AND FERROUS FUMARATE 1MG-20(21)
KIT ORAL
Qty: 84 TABLET | Refills: 3 | Status: SHIPPED | OUTPATIENT
Start: 2020-05-22 | End: 2021-05-24

## 2020-09-24 DIAGNOSIS — N30.00 ACUTE CYSTITIS WITHOUT HEMATURIA: ICD-10-CM

## 2020-09-24 DIAGNOSIS — B37.3 YEAST INFECTION OF THE VAGINA: Primary | ICD-10-CM

## 2020-09-24 RX ORDER — FLUCONAZOLE 150 MG/1
TABLET ORAL
Qty: 2 TABLET | Refills: 0 | Status: SHIPPED | OUTPATIENT
Start: 2020-09-24 | End: 2020-10-01

## 2020-09-25 ENCOUNTER — LAB (OUTPATIENT)
Dept: LAB | Facility: CLINIC | Age: 35
End: 2020-09-25
Payer: COMMERCIAL

## 2020-09-25 DIAGNOSIS — N30.00 ACUTE CYSTITIS WITHOUT HEMATURIA: ICD-10-CM

## 2020-09-25 LAB
BACTERIA UR QL AUTO: NORMAL /HPF
BILIRUB UR QL STRIP: NEGATIVE
CLARITY UR: CLEAR
COLOR UR: YELLOW
GLUCOSE UR STRIP-MCNC: NEGATIVE MG/DL
HGB UR QL STRIP.AUTO: NEGATIVE
HYALINE CASTS #/AREA URNS LPF: NORMAL /LPF
KETONES UR STRIP-MCNC: NEGATIVE MG/DL
LEUKOCYTE ESTERASE UR QL STRIP: NEGATIVE
NITRITE UR QL STRIP: NEGATIVE
NON-SQ EPI CELLS URNS QL MICRO: NORMAL /HPF
PH UR STRIP.AUTO: 6.5 [PH]
PROT UR STRIP-MCNC: NEGATIVE MG/DL
RBC #/AREA URNS AUTO: NORMAL /HPF
SP GR UR STRIP.AUTO: 1.02 (ref 1–1.03)
UROBILINOGEN UR QL STRIP.AUTO: 0.2 E.U./DL
WBC #/AREA URNS AUTO: NORMAL /HPF

## 2020-09-25 PROCEDURE — 87086 URINE CULTURE/COLONY COUNT: CPT

## 2020-09-25 PROCEDURE — 81001 URINALYSIS AUTO W/SCOPE: CPT | Performed by: NURSE PRACTITIONER

## 2020-09-25 PROCEDURE — 87186 SC STD MICRODIL/AGAR DIL: CPT

## 2020-09-25 PROCEDURE — 87077 CULTURE AEROBIC IDENTIFY: CPT

## 2020-09-27 LAB — BACTERIA UR CULT: ABNORMAL

## 2020-09-29 ENCOUNTER — DOCUMENTATION (OUTPATIENT)
Dept: FAMILY MEDICINE CLINIC | Facility: CLINIC | Age: 35
End: 2020-09-29

## 2020-09-29 DIAGNOSIS — R31.9 URINARY TRACT INFECTION WITH HEMATURIA, SITE UNSPECIFIED: Primary | ICD-10-CM

## 2020-09-29 DIAGNOSIS — N39.0 URINARY TRACT INFECTION WITH HEMATURIA, SITE UNSPECIFIED: Primary | ICD-10-CM

## 2020-09-29 RX ORDER — SULFAMETHOXAZOLE AND TRIMETHOPRIM 800; 160 MG/1; MG/1
1 TABLET ORAL 2 TIMES DAILY
Qty: 10 TABLET | Refills: 0 | Status: SHIPPED | OUTPATIENT
Start: 2020-09-29 | End: 2020-10-04

## 2020-10-09 ENCOUNTER — OFFICE VISIT (OUTPATIENT)
Dept: FAMILY MEDICINE CLINIC | Facility: CLINIC | Age: 35
End: 2020-10-09
Payer: COMMERCIAL

## 2020-10-09 VITALS
HEIGHT: 64 IN | WEIGHT: 146.8 LBS | RESPIRATION RATE: 20 BRPM | OXYGEN SATURATION: 98 % | HEART RATE: 66 BPM | TEMPERATURE: 96.6 F | SYSTOLIC BLOOD PRESSURE: 120 MMHG | DIASTOLIC BLOOD PRESSURE: 70 MMHG | BODY MASS INDEX: 25.06 KG/M2

## 2020-10-09 DIAGNOSIS — E55.9 VITAMIN D DEFICIENCY: ICD-10-CM

## 2020-10-09 DIAGNOSIS — E66.3 OVERWEIGHT WITH BODY MASS INDEX (BMI) OF 25 TO 25.9 IN ADULT: ICD-10-CM

## 2020-10-09 DIAGNOSIS — Z13.29 SCREENING FOR THYROID DISORDER: ICD-10-CM

## 2020-10-09 DIAGNOSIS — Z13.220 SCREENING CHOLESTEROL LEVEL: ICD-10-CM

## 2020-10-09 DIAGNOSIS — Z00.00 ANNUAL PHYSICAL EXAM: Primary | ICD-10-CM

## 2020-10-09 PROCEDURE — 3725F SCREEN DEPRESSION PERFORMED: CPT | Performed by: NURSE PRACTITIONER

## 2020-10-09 PROCEDURE — 1036F TOBACCO NON-USER: CPT | Performed by: NURSE PRACTITIONER

## 2020-10-09 PROCEDURE — 99395 PREV VISIT EST AGE 18-39: CPT | Performed by: NURSE PRACTITIONER

## 2021-05-24 DIAGNOSIS — Z30.09 CONTRACEPTIVE USE EDUCATION: ICD-10-CM

## 2021-05-24 RX ORDER — NORETHINDRONE ACETATE/ETHINYL ESTRADIOL AND FERROUS FUMARATE 1MG-20(21)
KIT ORAL
Qty: 84 TABLET | Refills: 3 | Status: SHIPPED | OUTPATIENT
Start: 2021-05-24

## 2021-08-19 ENCOUNTER — TELEPHONE (OUTPATIENT)
Dept: FAMILY MEDICINE CLINIC | Facility: CLINIC | Age: 36
End: 2021-08-19

## 2021-08-19 NOTE — TELEPHONE ENCOUNTER
----- Message from [facility] sent at 8/18/2021 11:01 PM EDT -----  Labs are good  T4 is slightly elevated but TSH is normal  Please ask patient to schedule her annual physical when it is due after October 9th  Thanks!

## 2021-10-20 ENCOUNTER — HOSPITAL ENCOUNTER (EMERGENCY)
Facility: HOSPITAL | Age: 36
Discharge: HOME | End: 2021-10-20
Attending: INTERNAL MEDICINE | Admitting: INTERNAL MEDICINE
Payer: COMMERCIAL

## 2021-10-20 VITALS
TEMPERATURE: 97.7 F | RESPIRATION RATE: 18 BRPM | HEART RATE: 87 BPM | SYSTOLIC BLOOD PRESSURE: 154 MMHG | OXYGEN SATURATION: 98 % | DIASTOLIC BLOOD PRESSURE: 85 MMHG

## 2021-10-20 DIAGNOSIS — R21 RASH: Primary | ICD-10-CM

## 2021-10-20 DIAGNOSIS — B08.1 MOLLUSCUM CONTAGIOSUM: ICD-10-CM

## 2021-10-20 PROCEDURE — 99282 EMERGENCY DEPT VISIT SF MDM: CPT | Performed by: INTERNAL MEDICINE

## 2021-10-20 PROCEDURE — 99282 EMERGENCY DEPT VISIT SF MDM: CPT

## 2021-10-29 ENCOUNTER — OFFICE VISIT (OUTPATIENT)
Dept: FAMILY MEDICINE CLINIC | Facility: CLINIC | Age: 36
End: 2021-10-29
Payer: COMMERCIAL

## 2021-10-29 VITALS
TEMPERATURE: 99.3 F | BODY MASS INDEX: 24.92 KG/M2 | OXYGEN SATURATION: 98 % | RESPIRATION RATE: 20 BRPM | DIASTOLIC BLOOD PRESSURE: 74 MMHG | HEART RATE: 70 BPM | HEIGHT: 64 IN | SYSTOLIC BLOOD PRESSURE: 118 MMHG | WEIGHT: 146 LBS

## 2021-10-29 DIAGNOSIS — Z23 ENCOUNTER FOR IMMUNIZATION: ICD-10-CM

## 2021-10-29 DIAGNOSIS — Z00.00 ANNUAL PHYSICAL EXAM: Primary | ICD-10-CM

## 2021-10-29 DIAGNOSIS — E66.3 OVERWEIGHT WITH BODY MASS INDEX (BMI) OF 25 TO 25.9 IN ADULT: ICD-10-CM

## 2021-10-29 PROCEDURE — 99395 PREV VISIT EST AGE 18-39: CPT | Performed by: NURSE PRACTITIONER

## 2021-10-29 PROCEDURE — 90686 IIV4 VACC NO PRSV 0.5 ML IM: CPT

## 2021-10-29 PROCEDURE — 90471 IMMUNIZATION ADMIN: CPT

## 2021-10-29 RX ORDER — IVERMECTIN 3 MG/1
6 TABLET ORAL 2 TIMES DAILY
COMMUNITY
Start: 2021-10-27 | End: 2021-10-29

## 2021-10-29 RX ORDER — PREDNISONE 10 MG/1
TABLET ORAL
COMMUNITY
Start: 2021-10-27 | End: 2021-10-29

## 2021-11-24 DIAGNOSIS — N39.0 URINARY TRACT INFECTION WITHOUT HEMATURIA, SITE UNSPECIFIED: Primary | ICD-10-CM

## 2021-11-24 RX ORDER — NITROFURANTOIN 25; 75 MG/1; MG/1
100 CAPSULE ORAL 2 TIMES DAILY
Qty: 10 CAPSULE | Refills: 0 | Status: SHIPPED | OUTPATIENT
Start: 2021-11-24 | End: 2021-11-29

## 2022-06-01 DIAGNOSIS — R22.1 NECK FULLNESS: Primary | ICD-10-CM

## 2022-06-07 ENCOUNTER — HOSPITAL ENCOUNTER (OUTPATIENT)
Dept: ULTRASOUND IMAGING | Facility: CLINIC | Age: 37
Discharge: HOME | End: 2022-06-07
Payer: COMMERCIAL

## 2022-06-07 DIAGNOSIS — R22.1 NECK FULLNESS: ICD-10-CM

## 2022-06-07 PROCEDURE — 76536 US EXAM OF HEAD AND NECK: CPT

## 2022-08-26 ENCOUNTER — TELEPHONE (OUTPATIENT)
Dept: FAMILY MEDICINE CLINIC | Facility: CLINIC | Age: 37
End: 2022-08-26

## 2022-08-26 NOTE — TELEPHONE ENCOUNTER
Patient called today  She said there was a bat in her home last night and was unsure how long  She states her friend told her to call as there are some people who have gotten scratched/bit by bats and do not know that they were  She is asking if she needs to be treated at all? Please advise

## 2022-08-26 NOTE — TELEPHONE ENCOUNTER
Was she scratched or did she get a bite? The only concern if she was is to get rabies shots- She should go to ER for rabies vaccination if she feels she needs them  If she has no scratch or bite, it would not be recommended  Her tetanus shot is up to date and would not be needed if the bat scratched/bit her

## 2022-11-28 ENCOUNTER — TELEPHONE (OUTPATIENT)
Dept: FAMILY MEDICINE CLINIC | Facility: CLINIC | Age: 37
End: 2022-11-28

## 2022-11-28 DIAGNOSIS — J11.1 FLU: Primary | ICD-10-CM

## 2022-11-28 RX ORDER — OSELTAMIVIR PHOSPHATE 75 MG/1
75 CAPSULE ORAL DAILY
Qty: 7 CAPSULE | Refills: 0 | Status: SHIPPED | OUTPATIENT
Start: 2022-11-28 | End: 2022-12-05

## 2022-11-28 NOTE — TELEPHONE ENCOUNTER
If she wants to I'm fine with it  It can have side effects   You can send to me for approval  She is overdue for her annual visit- please ask her to schedule

## 2022-11-28 NOTE — TELEPHONE ENCOUNTER
Notified patient  Sent for approval  She will call back tonight when she gets out of work to schedule

## 2022-11-28 NOTE — TELEPHONE ENCOUNTER
Patient called stating her daughters both tested positive for influenza A  She would like to know if she should take tamiflu? She does not have symptoms at this time